# Patient Record
(demographics unavailable — no encounter records)

---

## 2021-03-07 NOTE — CAT SCAN REPORT
CT head/brain wo con



INDICATION:

worsening left sided weakness/numbness.



TECHNIQUE: Routine CT head. All CT scans at this location are performed using CT dose reduction for A
JUWAN by means of automated exposure control.



COMPARISON: 

None.



FINDINGS:



Intracranial: Confluent periventricular and centrum semiovale white matter hypoattenuation most consi
stent with sequela of chronic microvascular disease. Age-indeterminate lacunar infarction. Artifact d
egrades image quality of the posterior fossa. Gray-white matter differentiation is maintained. No int
racranial hemorrhage. No extra axial collection. No hydrocephalus. No herniation.

Sinuses: Paranasal sinuses and mastoid air cells are essentially clear.

Orbits: Globes are intact. 

Calvarium: No acute fracture.





IMPRESSION:

1.  Age-indeterminate right thalamic lacunar infarction which is likely subacute or chronic.



2. Advanced white matter disease most consistent with sequela of chronic microvascular disease.



Signer Name: Alfonso Ren MD 

Signed: 3/7/2021 3:08 PM

Workstation Name: [id number]

## 2021-03-07 NOTE — EVENT NOTE
ED Screening Note


Date of service: 03/07/21


Time: 13:12


ED Screening Note: 


Patient with past medical history of hypertension, glaucoma, and a CVA back in 

September presents to the ER today complaint of worsening left-sided weakness 

and numbness since the stroke in September.  States his symptoms been gradually 

getting worse over the past few months.  He also reports worsening shortness of 

breath and chest pain over the past couple months.  He also reports generalized 

shaking, decreased appetite, abdominal pain, and abnormal gait.  He states his 

symptoms have been going on since his stroke in December but he feels like it is

getting worse.  He states that he fell twice yesterday and today due to his 

balance issues.  He denies any head injury.  Patient supposed to be on Xarelto 

but he has been out of all of his medications for about a month.








This initial assessment/diagnostic orders/clinical plan/treatment(s) is/are 

subject to change based on patients health status, clinical progression and re-

assessment by fellow clinical providers in the ED. Further treatment and workup 

at subsequent clinical providers discretion. Patient/guardian urged not to elope

from the ED as their condition may be serious if not clinically assessed and 

managed. 





Initial orders include: 


CBC, CMP, EKG, troponin, chest x-ray, urine, head CT

## 2021-03-07 NOTE — EMERGENCY DEPARTMENT REPORT
HPI





- General


Chief Complaint: Neuro Symptoms/Deficit


Time Seen by Provider: 03/07/21 12:54





- HPI


HPI: 





This is a 71-year-old -American male who presents to the emergency 

department with complaint of increased numbness and discomfort to the left side 

of his body over the past few months.  The patient had a stroke in September for

which she went to [facility].  This stroke left him with left-sided weakness

and numbness, and also left him with an upper extremity tremor.  The patient 

presents to the emergency department today saying that he has had increased 

numbness and discomfort to the left side of his face, left arm, left side of his

chest and flank, and down his left leg, and that increased over the past few 

months.  The patient is concerned as he says that one of the physicians at [facility]

told him that he would most likely have improvement of his symptoms from the 

stroke.  Patient also has a history of hypertension.  He is a tobacco smoker.  

He does not follow with any primary care physician or neurologist.  He denies 

any headache, vision change, slurred speech, fever, or any acute neurological 

changes.





ED Past Medical Hx





- Past Medical History


Previous Medical History?: Yes


Additional medical history: stroke





- Surgical History


Past Surgical History?: No





- Social History


Smoking Status: Never Smoker


Substance Use Type: None





- Medications


Home Medications: 


                                Home Medications











 Medication  Instructions  Recorded  Confirmed  Last Taken  Type


 


Gabapentin 300 mg PO BID #14 cap 03/07/21  Unknown Rx














ED Review of Systems


ROS: 


Stated complaint: NUMBNESS LEFT SIDE


Other details as noted in HPI





Comment: All other systems reviewed and negative


Constitutional: denies: chills, fever


Eyes: denies: eye pain, vision change


ENT: denies: ear pain, throat pain


Respiratory: denies: cough, shortness of breath


Cardiovascular: denies: chest pain, palpitations


Gastrointestinal: denies: abdominal pain, vomiting


Genitourinary: denies: dysuria, discharge


Musculoskeletal: myalgia.  denies: back pain, arthralgia


Skin: denies: rash, lesions


Neurological: numbness.  denies: headache





Physical Exam





- Physical Exam


Physical Exam: 





GENERAL: The patient is well-developed well-nourished.


HENT: Normocephalic.  Atraumatic.    Patient has moist mucous membranes.


EYES: Extraocular motions are intact.  No nystagmus.


NECK: Supple. Trachea is midline.


CHEST/LUNGS: Clear to auscultation.  There is no respiratory distress noted.


HEART/CARDIOVASCULAR: Regular.  There is no tachycardia.  There is no murmur.


ABDOMEN: Abdomen is soft, nontender.  Patient has normal bowel sounds.  There is

no abdominal distention.


SKIN: Skin is warm and dry. 


NEURO: The patient is awake, alert, and oriented.  The patient is cooperative.  

Subjective decrease sensation to the left side of the face, left arm and left 

leg when compared to the right.  Bilateral upper extremity distal tremor.  No 

facial asymmetry.  There is a mild left upper and lower extremity pronator 

drift.


MUSCULOSKELETAL: There is no tenderness or deformity.  There is no limitation 

range of motion. 





ED Medical Decision Making





- Lab Data


Result diagrams: 


                                 03/07/21 13:16





                                 03/07/21 13:16





                                   Lab Results











  03/07/21 03/07/21 03/07/21 Range/Units





  13:16 13:16 16:27 


 


WBC  3.4 L    (4.5-11.0)  K/mm3


 


RBC  4.30    (3.65-5.03)  M/mm3


 


Hgb  14.6    (11.8-15.2)  gm/dl


 


Hct  42.6    (35.5-45.6)  %


 


MCV  99 H    (84-94)  fl


 


MCH  34 H    (28-32)  pg


 


MCHC  34    (32-34)  %


 


RDW  16.1 H    (13.2-15.2)  %


 


Plt Count  237    (140-440)  K/mm3


 


Lymph % (Auto)  34.2    (13.4-35.0)  %


 


Mono % (Auto)  9.7 H    (0.0-7.3)  %


 


Eos % (Auto)  1.7    (0.0-4.3)  %


 


Baso % (Auto)  0.6    (0.0-1.8)  %


 


Lymph # (Auto)  1.2    (1.2-5.4)  K/mm3


 


Mono # (Auto)  0.3    (0.0-0.8)  K/mm3


 


Eos # (Auto)  0.1    (0.0-0.4)  K/mm3


 


Baso # (Auto)  0.0    (0.0-0.1)  K/mm3


 


Seg Neutrophils %  53.8    (40.0-70.0)  %


 


Seg Neutrophils #  1.8    (1.8-7.7)  K/mm3


 


Sodium   141   (137-145)  mmol/L


 


Potassium   3.7   (3.6-5.0)  mmol/L


 


Chloride   104.1   ()  mmol/L


 


Carbon Dioxide   26   (22-30)  mmol/L


 


Anion Gap   15   mmol/L


 


BUN   7 L   (9-20)  mg/dL


 


Creatinine   0.8   (0.8-1.3)  mg/dL


 


Estimated GFR   > 60   ml/min


 


BUN/Creatinine Ratio   9   %


 


Glucose   106 H   ()  mg/dL


 


Calcium   9.2   (8.4-10.2)  mg/dL


 


Magnesium   1.70   (1.7-2.3)  mg/dL


 


Total Bilirubin   0.40   (0.1-1.2)  mg/dL


 


AST   33   (5-40)  units/L


 


ALT   14   (7-56)  units/L


 


Alkaline Phosphatase   83   ()  units/L


 


Troponin T   < 0.010   (0.00-0.029)  ng/mL


 


Total Protein   7.6   (6.3-8.2)  g/dL


 


Albumin   4.2   (3.9-5)  g/dL


 


Albumin/Globulin Ratio   1.2   %


 


Urine Color     (Yellow)  


 


Urine Turbidity     (Clear)  


 


Urine pH     (5.0-7.0)  


 


Ur Specific Gravity     (1.003-1.030)  


 


Urine Protein     (Negative)  mg/dL


 


Urine Glucose (UA)     (Negative)  mg/dL


 


Urine Ketones     (Negative)  mg/dL


 


Urine Blood     (Negative)  


 


Urine Nitrite     (Negative)  


 


Ur Reducing Substances     


 


Urine Bilirubin     (Negative)  


 


Urine Ictotest     


 


Urine Urobilinogen     (<2.0)  mg/dL


 


Ur Leukocyte Esterase     (Negative)  


 


Urine WBC (Auto)     (0.0-6.0)  /HPF


 


Urine RBC (Auto)     (0.0-6.0)  /HPF


 


Urine Opiates Screen    Negative  


 


Urine Methadone Screen    Negative  


 


Ur Barbiturates Screen    Negative  


 


Ur Phencyclidine Scrn    Negative  


 


Ur Amphetamines Screen    Negative  


 


U Benzodiazepines Scrn    Negative  


 


Urine Cocaine Screen    Negative  


 


U Marijuana (THC) Screen    Positive  


 


Drugs of Abuse Note    Disclamer  














  03/07/21 Range/Units





  16:27 


 


WBC   (4.5-11.0)  K/mm3


 


RBC   (3.65-5.03)  M/mm3


 


Hgb   (11.8-15.2)  gm/dl


 


Hct   (35.5-45.6)  %


 


MCV   (84-94)  fl


 


MCH   (28-32)  pg


 


MCHC   (32-34)  %


 


RDW   (13.2-15.2)  %


 


Plt Count   (140-440)  K/mm3


 


Lymph % (Auto)   (13.4-35.0)  %


 


Mono % (Auto)   (0.0-7.3)  %


 


Eos % (Auto)   (0.0-4.3)  %


 


Baso % (Auto)   (0.0-1.8)  %


 


Lymph # (Auto)   (1.2-5.4)  K/mm3


 


Mono # (Auto)   (0.0-0.8)  K/mm3


 


Eos # (Auto)   (0.0-0.4)  K/mm3


 


Baso # (Auto)   (0.0-0.1)  K/mm3


 


Seg Neutrophils %   (40.0-70.0)  %


 


Seg Neutrophils #   (1.8-7.7)  K/mm3


 


Sodium   (137-145)  mmol/L


 


Potassium   (3.6-5.0)  mmol/L


 


Chloride   ()  mmol/L


 


Carbon Dioxide   (22-30)  mmol/L


 


Anion Gap   mmol/L


 


BUN   (9-20)  mg/dL


 


Creatinine   (0.8-1.3)  mg/dL


 


Estimated GFR   ml/min


 


BUN/Creatinine Ratio   %


 


Glucose   ()  mg/dL


 


Calcium   (8.4-10.2)  mg/dL


 


Magnesium   (1.7-2.3)  mg/dL


 


Total Bilirubin   (0.1-1.2)  mg/dL


 


AST   (5-40)  units/L


 


ALT   (7-56)  units/L


 


Alkaline Phosphatase   ()  units/L


 


Troponin T   (0.00-0.029)  ng/mL


 


Total Protein   (6.3-8.2)  g/dL


 


Albumin   (3.9-5)  g/dL


 


Albumin/Globulin Ratio   %


 


Urine Color  Yellow  (Yellow)  


 


Urine Turbidity  Clear  (Clear)  


 


Urine pH  7.0  (5.0-7.0)  


 


Ur Specific Gravity  1.013  (1.003-1.030)  


 


Urine Protein  <15 mg/dl  (Negative)  mg/dL


 


Urine Glucose (UA)  Neg  (Negative)  mg/dL


 


Urine Ketones  Neg  (Negative)  mg/dL


 


Urine Blood  Neg  (Negative)  


 


Urine Nitrite  Neg  (Negative)  


 


Ur Reducing Substances  Not Reportable  


 


Urine Bilirubin  Neg  (Negative)  


 


Urine Ictotest  Not Reportable  


 


Urine Urobilinogen  < 2.0  (<2.0)  mg/dL


 


Ur Leukocyte Esterase  Neg  (Negative)  


 


Urine WBC (Auto)  < 1.0  (0.0-6.0)  /HPF


 


Urine RBC (Auto)  1.0  (0.0-6.0)  /HPF


 


Urine Opiates Screen   


 


Urine Methadone Screen   


 


Ur Barbiturates Screen   


 


Ur Phencyclidine Scrn   


 


Ur Amphetamines Screen   


 


U Benzodiazepines Scrn   


 


Urine Cocaine Screen   


 


U Marijuana (THC) Screen   


 


Drugs of Abuse Note   














- Radiology Data


Radiology results: report reviewed, image reviewed


interpreted by me: 





Chest x-ray does not show any acute process.  There are no pleural effusions, 

obvious pneumonia and there is no pneumothorax. No significant cardiomegaly.





CT head/brain wo con INDICATION: worsening left sided weakness/numbness. 

TECHNIQUE: Routine CT head. All CT scans at this location are performed using CT

dose reduction for ALARA by means of automated exposure control. COMPARISON: 

None. FINDINGS: Intracranial: Confluent periventricular and centrum semiovale 

white matter hypoattenuation most consistent with sequela of chronic 

microvascular disease. Age-indeterminate lacunar infarction. Artifact degrades 

image quality of the posterior fossa. Gray-white matter differentiation is 

maintained. No intracranial hemorrhage. No extra axial collection. No hyd

rocephalus. No herniation. Sinuses: Paranasal sinuses and mastoid air cells are 

essentially clear. Orbits: Globes are intact. Calvarium: No acute fracture. 

IMPRESSION: 1. Age-indeterminate right thalamic lacunar infarction which is 

likely subacute or chronic. 2. Advanced white matter disease most consistent 

with sequela of chronic microvascular disease. 





- Medical Decision Making





This patient presents to the emergency department with a complaint of some num

bness, but also discomfort, to the left side of his body including the face, 

torso, arm and leg.  Overall the patient says that this has been going on since 

he had a stroke back in September 2020, but it increased over the past few 

months.  He denies any acute physical or neurological changes.  Patient says 

that the discomfort he is experiencing is keeping him from getting appropriate 

sleep at night.  On examination the patient does have some subjective decrease 

sensation to the left side of his body when compared to the right.  There is 

some mild pronator drift to the left upper and lower extremities but this is 

chronic since his CVA.  The patient was seen getting up and ambulating around t

he emergency department multiple times using his cane.





The patient had a CT scan of the head without contrast through triage that does 

not show any acute process.  There is an age-indeterminate right thalamic 

lacunar infarct that is most likely subacute versus chronic and some chronic 

advanced white matter disease.





The patient's labs are mostly unremarkable including CBC, metabolic panel, 

urinalysis and UDS, except for UDS positive for marijuana.





Vital signs have been reassuring throughout his ED course including being 

afebrile.





The patient denies any new or acute weakness.  The decrease sensation/numbness 

also appears to be chronic.  I suspect that the discomfort that he is 

experiencing is some type of neuropathy.





The patient continues to smoke cigarettes.  He does not follow-up with primary 

care regularly and does not have a neurologist.





He appears safe for discharge home.  He will be given outpatient referrals for 

primary care and a neurologist.  He has been given a prescription for some 

gabapentin to help with the suspected neuropathy and hopefully will also help 

him get rest at night.  We had a long conversation about smoking cessation and 

the importance of outpatient follow-up.





The patient also understands the importance of returning to the closest 

emergency department if he does experience any worsening of his symptoms, any 

new or concerning symptoms not addressed during this emergency department visit,

or with any acute distress.


Critical Care Time: No


Critical care attestation.: 


If time is entered above; I have spent that time in minutes in the direct care 

of this critically ill patient, excluding procedure time.








ED Disposition


Clinical Impression: 


 Neuropathy, History of CVA (cerebrovascular accident), Tobacco use disorder





Disposition: DC-01 TO HOME OR SELFCARE


Is pt being admited?: No


Condition: Stable


Instructions:  Neuropathic Pain, Tobacco Use Disorder


Additional Instructions: 


Please follow-up with a primary care physician in the next few days.  I have 

given you a referral for a local primary care physician, Dr. Joaquin Sewell, as 

well as a local clinic, [facility].





I have given you a referral for a local neurologist, Dr. Dos Santos, to follow-up 

regarding your history of a stroke and what may be some neuropathic pain (nerve 

pain).





You have been prescribed a medication that is sedating and therefore should not 

be taken prior to driving, working, and responsible for children and in no way 

should be mixed with alcohol of any quantity.





Return to the emergency department with any worsening of your symptoms, new or 

concerning symptoms not addressed during this current emergency department 

visit, or with any acute distress.


Prescriptions: 


Gabapentin 300 mg PO BID #14 cap


Referrals: 


PRIMARY CARE,MD [Primary Care Provider] - 2-3 Days


JAYCEE DOS SANTOS MD [Referring] - 2-3 Days


JOAQUIN SEWELL MD [Staff Physician] - 2-3 Days


[facility] [Provider Group] - 2-3 Days


Time of Disposition: 17:25

## 2021-07-09 NOTE — XRAY REPORT
CHEST 1 VIEW 



INDICATION: 

dyspnea/chest pain.



COMPARISON: 

None



FINDINGS:



SUPPORT DEVICES: None.



HEART: Within normal limits. 



LUNGS/PLEURA: No acute air space or interstitial disease. 



ADDITIONAL FINDINGS: Moderate dextroscoliosis centered at the lower thoracic spine level.







IMPRESSION:

1. No acute findings.



Signer Name: Yves Collazo MD 

Signed: 3/7/2021 2:05 PM

Workstation Name: [id number]

## 2021-11-16 NOTE — EMERGENCY DEPARTMENT REPORT
ED Fall HPI





- General


Stated Complaint: FALL INJURY


Time Seen by Provider: 11/16/21 22:48


Source: patient, EMS


Mode of arrival: Stretcher


Limitations: No Limitations





- History of Present Illness


Initial Comments: 





CC: "I fell."





HPI: This is a 1-year-old male history of CVA with residual paralysis and gait 

instability who presents with left rib cage pain after fall 2 days ago.  He 

normally walks with a cane or walker.  He stumbled and fell onto the ground.  He

denies head trauma.  He denies loss consciousness.  He denies any associated 

injury.  He is otherwise in his normal state of health.


MD Complaint: fall


-: Sudden


Fall From: standing


When Fall Occurred: other (3 days ago)


Place Fall Occurred: home


Loss of Consciousness: none


Prolonged Down Time?: no


Symptoms Prior to Fall: none


Location: chest


Severity: mild


Quality: aching


Context: tripped/slipped


Associated Symptoms: denies





- Related Data


                                  Previous Rx's











 Medication  Instructions  Recorded  Last Taken  Type


 


Gabapentin 300 mg PO BID #14 cap 03/07/21 Unknown Rx


 


HYDROcodone/APAP 5-325 [[place] 1 each PO Q6HR PRN #15 tablet 11/17/21 Unknown Rx





5/325]    


 


Ibuprofen [Motrin 400 MG tab] 400 mg PO Q8H PRN #20 tablet 11/17/21 Unknown Rx











                                    Allergies











Allergy/AdvReac Type Severity Reaction Status Date / Time


 


No Known Allergies Allergy   Verified 03/07/21 12:49














ED Review of Systems


ROS: 


Stated complaint: FALL INJURY


Other details as noted in HPI





Comment: All other systems reviewed and negative


Constitutional: denies: fever


Respiratory: denies: cough, shortness of breath


Cardiovascular: chest pain


Gastrointestinal: denies: abdominal pain, nausea, vomiting


Neurological: denies: headache





ED Past Medical Hx





- Past Medical History


Previous Medical History?: Yes


Hx CVA: Yes


Additional medical history: stroke





- Surgical History


Past Surgical History?: Yes


Additional Surgical History: Neck surgery





- Social History


Smoking Status: Current Every Day Smoker


Substance Use Type: Alcohol





- Medications


Home Medications: 


                                Home Medications











 Medication  Instructions  Recorded  Confirmed  Last Taken  Type


 


Gabapentin 300 mg PO BID #14 cap 03/07/21  Unknown Rx


 


HYDROcodone/APAP 5-325 [[place] 1 each PO Q6HR PRN #15 tablet 11/17/21  Unknown Rx





5/325]     


 


Ibuprofen [Motrin 400 MG tab] 400 mg PO Q8H PRN #20 tablet 11/17/21  Unknown Rx














ED Physical Exam





- General


Limitations: No Limitations


General appearance: alert, in no apparent distress





- Head


Head exam: Present: atraumatic, normocephalic





- Eye


Eye exam: Present: normal appearance





- ENT


ENT exam: Present: mucous membranes moist





- Neck


Neck exam: Present: normal inspection, full ROM





- Respiratory


Respiratory exam: Present: normal lung sounds bilaterally, chest wall 

tenderness.  Absent: respiratory distress, wheezes, rales, rhonchi





- Cardiovascular


Cardiovascular Exam: Present: regular rate, normal rhythm.  Absent: systolic 

murmur, diastolic murmur, rubs, gallop





- GI/Abdominal


GI/Abdominal exam: Present: soft, normal bowel sounds.  Absent: distended, 

tenderness, guarding, rebound





- Extremities Exam


Extremities exam: Present: normal inspection





- Back Exam


Back exam: Present: normal inspection





- Neurological Exam


Neurological exam: Present: alert, oriented X3





- Psychiatric


Psychiatric exam: Present: normal affect, normal mood





- Skin


Skin exam: Present: warm, dry, intact, normal color.  Absent: rash





ED Course


                                   Vital Signs











  11/16/21





  22:52


 


Temperature 98.1 F


 


Pulse Rate 81


 


Respiratory 20





Rate 


 


Blood Pressure 155/117


 


O2 Sat by Pulse 97





Oximetry 














ED Medical Decision Making





- Radiology Data


Radiology results: report reviewed





Patient Name: ABRAHAM ARORA


Patient ID: [id number]


Gender: Male


YOB: 1950


Home Phone: [phone]


Referring Provider: DEREK GUTIERREZ


Organization: [facility]


Accession Number: [id number]


Requested Date: November 16, 2021 22:49


Report Status: Final


Requested Procedure: 1


Procedure Description: XR ribs UNI w PA chest 3+V LT


Modality: XR


Findings


Reporting MD: Nile Michelle


Dictation Time: November 16, 2021 22:54


: Not available


Transcription Date:


Chest with left RIBS 4 views. 


HISTORY: Rib pain status post fall. 


FINDINGS: Heart size is normal. 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

the lower thoracic spine. The lungs are clear. 


Fractures involving the anterolateral aspect of the left sixth and seventh ribs.




Signer Name: Nile Michelle MD 


Signed: 11/16/2021 10:54 PM 


Workstation Name: [id number]





- Medical Decision Making





Rib fractures of the sixth and seventh rib without pneumothorax.  Prescribed 

Norco and ibuprofen.


Critical care attestation.: 


If time is entered above; I have spent that time in minutes in the direct care 

of this critically ill patient, excluding procedure time.








ED Disposition


Clinical Impression: 


 Ribs, multiple fractures





Disposition: 01 HOME / SELF CARE / HOMELESS


Is pt being admited?: No


Does the pt Need Aspirin: No


Condition: Stable


Instructions:  Rib Fracture, Easy-to-Read


Prescriptions: 


Ibuprofen [Motrin 400 MG tab] 400 mg PO Q8H PRN #20 tablet


 PRN Reason: Pain , Severe (7-10)


HYDROcodone/APAP 5-325 [[place] 5/325] 1 each PO Q6HR PRN #15 tablet


 PRN Reason: Pain


Referrals: 


OLAF PALMA MD [Staff Physician] - as needed

## 2021-11-16 NOTE — XRAY REPORT
Chest with left RIBS 4 views.



HISTORY: Rib pain status post fall.



FINDINGS: Heart size is normal. Prominent dextroconvex scoliosis has its apex at the lower thoracic s
pine. The lungs are clear.



Fractures involving the anterolateral aspect of the left sixth and seventh ribs.



Signer Name: Nile Michelle MD 

Signed: 11/16/2021 11:54 PM

Workstation Name: [email or web address]\""[id number]

## 2021-11-24 NOTE — EMERGENCY DEPARTMENT REPORT
HPI





- General


Chief Complaint: Abdominal Pain


Time Seen by Provider: 11/24/21 13:12





- HPI


HPI: 





Room 29








The patient is a 71-year-old male present with a chief complaint of rib pain.  

The patient sustained a fall from standing 11/14/2021 and presented to this ED 

11/16/2021 where he was diagnosed with a left anterolateral 6th and 7th rib 

fracture without pneumothorax.  The patient presented to this ED 11/18/2021 for 

the same pain after stating the person he gave his [place] prescription to had not

returned with his medication.  I saw the patient 11/22/2021 for the same and he 

informed me that the person who had given his [place] prescription to had not 

return with this medication.  I wrote the patient a prescription for Norco 5/325

#14 at that time upon discharge.  Today the patient returns for her rib pain 

when asked him if he has been taking the pain medication he states the person 

who he had given the prescription to to get filled has not returned with his 

medication.  I reminded the patient that he told me this last time that I saw 

him 2 days ago and then I had written him a prescription for pain medication.  

The patient then pauses and says "I do not know."





ED Past Medical Hx





- Past Medical History


Previous Medical History?: Yes


Hx Hypertension: Yes


Hx CVA: Yes


Additional medical history: stroke





- Surgical History


Additional Surgical History: Neck surgery





- Family History


Family history: no significant





- Social History


Smoking Status: Current Every Day Smoker (1/7 pack/day)


Substance Use Type: None (Denies illicit drug use)





- Medications


Home Medications: 


                                Home Medications











 Medication  Instructions  Recorded  Confirmed  Last Taken  Type


 


Gabapentin 300 mg PO BID #14 cap 03/07/21  Unknown Rx


 


HYDROcodone/APAP 5-325 [[place] 1 each PO Q6HR PRN #15 tablet 11/17/21  Unknown Rx





5/325]     


 


Ibuprofen [Motrin 400 MG tab] 400 mg PO Q8H PRN #20 tablet 11/17/21  Unknown Rx


 


HYDROcodone/APAP 5-325 [[place] 1 - 2 each PO Q6HR PRN #14 tablet 11/22/21  

Unknown Rx





5/325]     


 


Spirometers and Accessories 1 each MC TID #1 each 11/22/21  Unknown Rx





[Mistassist]     














ED Review of Systems


ROS: 


Stated complaint: FLANK PAIN


Other details as noted in HPI





Constitutional: no symptoms reported


Respiratory: no symptoms reported


Endocrine: no symptoms reported





Physical Exam





- Physical Exam


Vital Signs: 


                                   Vital Signs











  11/24/21 11/24/21





  12:33 13:50


 


Temperature 98.7 F 98.4 F


 


Pulse Rate 91 H 81


 


Respiratory 16 12





Rate  


 


Blood Pressure 176/118 122/74





[Right]  


 


O2 Sat by Pulse 96 97





Oximetry  











Physical Exam: 





GENERAL: The patient is well-developed well-nourished male sitting in chair not 

appearing to be in acute distress


HEENT: Normocephalic.  Atraumatic.  Extraocular motions are intact.  Patient has

 moist mucous membranes.


NECK: Supple.  Trachea midline


CHEST/LUNGS: Clear to auscultation.  There is no respiratory distress noted.


HEART/CARDIOVASCULAR: Regular.  There is no tachycardia.  There is no gallop rub

 or murmur.


ABDOMEN: Abdomen is soft, nontender.  Patient has normal bowel sounds.  There is

 no abdominal distention.


SKIN: There is no rash.  There is no edema.  There is no diaphoresis.


NEURO: The patient is awake, alert, and oriented.  The patient is cooperative.  

The patient has no focal neurologic deficits.  The patient has normal speech.  

GCS 15


MUSCULOSKELETAL: There is no evidence of acute injury.





ED Course


                                   Vital Signs











  11/24/21 11/24/21





  12:33 13:50


 


Temperature 98.7 F 98.4 F


 


Pulse Rate 91 H 81


 


Respiratory 16 12





Rate  


 


Blood Pressure 176/118 122/74





[Right]  


 


O2 Sat by Pulse 96 97





Oximetry  














ED Medical Decision Making





- Differential Diagnosis


Rib fracture


Critical care attestation.: 


If time is entered above; I have spent that time in minutes in the direct care 

of this critically ill patient, excluding procedure time.








ED Disposition


Clinical Impression: 


 Ribs, multiple fractures





Disposition: 01 HOME / SELF CARE / HOMELESS


Is pt being admited?: No


Does the pt Need Aspirin: No


Condition: Stable


Instructions:  Rib Fracture, Easy-to-Read


Additional Instructions: 


Return to the emergency department should you develop worsening symptoms, 

inability to tolerate food or liquids, high fever or any other concerns


Referrals: 


CHANDU CONNOR MD [Primary Care Provider] - 3-5 Days


LUIS MANUEL BERMUDEZ MD [Referring] - 3-5 Days


[facility] [Provider Group] - 3-5 Days


Time of Disposition: 14:22

## 2022-05-18 NOTE — XRAY REPORT
XR shoulder BILAT 2+V



INDICATION / CLINICAL INFORMATION:

Pain.



COMPARISON: 

None available.



FINDINGS:

No acute fracture or malalignment of either shoulder. There is mild osteoarthritis bilaterally. No fo
argelia soft tissue abnormality. Visualized lungs are clear.



IMPRESSION: No acute osseous abnormality of either shoulder.



Signer Name: Beto Bower MD 

Signed: 5/18/2022 11:51 PM

Workstation Name: [id number]-

## 2022-05-18 NOTE — EMERGENCY DEPARTMENT REPORT
HPI





- General


Time Seen by Provider: 22 23:01





- HPI


HPI: 





Room 1








The patient is a 72-year-old male present with a chief complaint of shoulder 

pain.  Patient states he came to the emergency department because he has had 

pain in both shoulders.  Patient states since his stroke several months ago he 

has had pain in his left side.  Patient states for the past 4 weeks he has 

frequently had intermittent pain in left shoulder described as sharp in nature. 

Patient also complains of pain in the right shoulder for the past 3 weeks 

intermittently and less frequent than the right.  Patient denies other symptoms.

 Patient currently gives his left shoulder pain a score of 8/10





ED Past Medical Hx





- Past Medical History


Hx Hypertension: Yes


Hx CVA: Yes (Left-sided weakness)


Additional medical history: stroke





- Surgical History


Additional Surgical History: Neck surgery





- Family History


Family history: no significant





- Social History


Smoking Status: Current Every Day Smoker ( pack/day)


Substance Use Type: None (Denies illicit drug use), Alcohol (Occasional)





- Medications


Home Medications: 


                                Home Medications











 Medication  Instructions  Recorded  Confirmed  Last Taken  Type


 


Gabapentin 300 mg PO BID #14 cap 21  Unknown Rx


 


HYDROcodone/APAP 5-325 [[place] 1 each PO Q6HR PRN #15 tablet 21  Unknown Rx





5/325]     


 


Ibuprofen [Motrin 400 MG tab] 400 mg PO Q8H PRN #20 tablet 21  Unknown Rx


 


HYDROcodone/APAP 5-325 [[place] 1 - 2 each PO Q6HR PRN #14 tablet 21  

Unknown Rx





5/325]     


 


Spirometers and Accessories 1 each MC TID #1 each 21  Unknown Rx





[Mistassist]     


 


Naproxen [Naprosyn] 500 mg PO Q12H PRN #20 22  Unknown Rx


 


traMADoL [Ultram] 50 mg PO Q6HR PRN #20 tablet 22  Unknown Rx














ED Review of Systems


ROS: 


Stated complaint: WEAKNESS


Other details as noted in HPI





Constitutional: no symptoms reported


Eyes: denies: eye pain


ENT: denies: throat pain


Respiratory: no symptoms reported


Cardiovascular: denies: chest pain


Endocrine: no symptoms reported


Gastrointestinal: denies: abdominal pain


Genitourinary: denies: dysuria


Musculoskeletal: arthralgia


Neurological: denies: headache





Physical Exam





- Physical Exam


Physical Exam: 





GENERAL: The patient is well-developed well-nourished male lying on stretcher 

not appearing to be in acute distress. []


HEENT: Normocephalic.  Atraumatic.  Extraocular motions are intact.  Patient has

 moist mucous membranes.


NECK: Supple.  Trachea midline


CHEST/LUNGS: Clear to auscultation.  There is no respiratory distress noted.


HEART/CARDIOVASCULAR: Regular.  There is no tachycardia.  There is no gallop rub

 or murmur.  2+ left radial


ABDOMEN: Abdomen is soft, nontender.  Patient has normal bowel sounds.  There is

 no abdominal distention.


SKIN: There is no rash.  There is no edema.  There is no diaphoresis.


NEURO: The patient is awake, alert, and oriented.  The patient is cooperative.  

The patient has left sided weakness from previous CVA.  The patient has normal 

speech


MUSCULOSKELETAL:  There is no evidence of acute injury.





ED Medical Decision Making





- Lab Data


Result diagrams: 


                                 22 23:31





                                 22 23:31





                                Laboratory Tests











  22





  23:31 23:31 02:32


 


WBC  6.1  


 


RBC  4.07  


 


Hgb  13.1  


 


Hct  39.2  


 


MCV  96 H  


 


MCH  32  


 


MCHC  33  


 


RDW  13.5  


 


Plt Count  144  


 


Lymph % (Auto)  11.5 L  


 


Mono % (Auto)  5.7  


 


Eos % (Auto)  0.3  


 


Baso % (Auto)  0.4  


 


Lymph # (Auto)  0.7 L  


 


Mono # (Auto)  0.3  


 


Eos # (Auto)  0.0  


 


Baso # (Auto)  0.0  


 


Seg Neutrophils %  82.1 H  


 


Seg Neutrophils #  5.0  


 


Sodium   141 


 


Potassium   3.7 


 


Chloride   104.2 


 


Carbon Dioxide   24 


 


Anion Gap   17 


 


BUN   23 H 


 


Creatinine   1.1 


 


Estimated GFR   > 60 


 


BUN/Creatinine Ratio   21 


 


Glucose   99 


 


Calcium   9.8 


 


Total Creatine Kinase   386 H 


 


CK-MB (CK-2)   4.9 H 


 


CK-MB (CK-2) Rel Index   1.2 


 


Troponin T   < 0.010  < 0.010














- EKG Data


-: EKG Interpreted by Me


EKG shows normal: sinus rhythm, axis


Rate: normal





- EKG Data


When compared to previous EKG there are: previous EKG unavailable


Interpretation: other (No ischemic changes seen)





- Radiology Data


Radiology results: report reviewed (Bilateral shoulder x-ray), image reviewed 

(Bilateral shoulder x-ray)


interpreted by me: 





Bilateral shoulder x-ray-no acute fracture, no dislocation





[facility] [address] [place] [address] 

XRay Report Signed Patient: ABRAHAM ARORA MR#: [id number] : 1950 

Acct:[id number] Age/Sex: 72 / M ADM Date: 22 Loc: ED Attending Dr: 

Ordering Physician: LENA VELAZQUEZ MD Date of Service: 22 Procedure(s): XR 

shoulder BILAT 2+V Accession Number(s): [id number] cc: LENA VELAZQUEZ MD Fluoro Time 

In Minutes: XR shoulder BILAT 2+V INDICATION / CLINICAL INFORMATION: Pain. 

COMPARISON: None available. FINDINGS: No acute fracture or malalignment of 

either shoulder. There is mild osteoarthritis bilaterally. No focal soft tissue 

abnormality. Visualized lungs are clear. IMPRESSION: No acute osseous 

abnormality of either shoulder. Signer Name: Krunal Bower MD Signed: 

2022 11:51 PM Workstation Name: [email or web address]- Transcribed By: JS Dictated 

By: KRUNAL BOWER MD Electronically Authenticated By: KRUNAL BOWER MD Signed Date/Time: 22 DD/DT: 22 TD/TT:








- Differential Diagnosis


Arthralgia, anginal equivalent,


Critical care attestation.: 


If time is entered above; I have spent that time in minutes in the direct care 

of this critically ill patient, excluding procedure time.








ED Disposition


Clinical Impression: 


 Left shoulder pain, Right shoulder pain





Disposition: 01 HOME / SELF CARE / HOMELESS


Is pt being admited?: No


Does the pt Need Aspirin: No


Condition: Stable


Instructions:  Shoulder Pain


Additional Instructions: 


Return to the emergency department should you develop worsening symptoms, 

inability to tolerate food or liquids, high fever or any other concerns


Prescriptions: 


Naproxen [Naprosyn] 500 mg PO Q12H PRN #20


 PRN Reason: Pain, Moderate (4-6)


traMADoL [Ultram] 50 mg PO Q6HR PRN #20 tablet


 PRN Reason: Pain


Referrals: 


MASHA FERRIS MD [Staff Physician] - 3-5 Days (Dr. Ferris is an 

orthopedic surgeon.  Please follow-up with him for further evaluation)


Time of Disposition: 03:43

## 2022-05-20 NOTE — EMERGENCY DEPARTMENT REPORT
HPI





- General


Chief Complaint: Alcohol


Time Seen by Provider: 05/20/22 06:51





- HPI


HPI: 





Room 1











The patient is a 72-year-old male brought in by police for unknown reasons.  The

patient was seen here last night and left AGAINST MEDICAL ADVICE.  Patient s

tates she was standing outside of a convenience store when police contacted.  

When asked why the patient states he does not know.  The patient states police 

told him "they are going to come see me."  Patient denies any preceding 

altercation/assault.  Patient denies complaints.  Per the charge nurse PD did 

not have addressed to the patient's group home so he was dropped off here in the

ED.  Transportation to take the patient to his group home has already been 

arranged for 09:30





ED Past Medical Hx





- Past Medical History


Hx Hypertension: Yes


Hx CVA: Yes (Left-sided weakness)


Additional medical history: stroke





- Surgical History


Additional Surgical History: Neck surgery





- Family History


Family history: no significant





- Social History


Smoking Status: Current Every Day Smoker (1/14 pack/day)


Substance Use Type: None (Denies illicit drug use), Alcohol (Occasional)





- Medications


Home Medications: 


                                Home Medications











 Medication  Instructions  Recorded  Confirmed  Last Taken  Type


 


Gabapentin 300 mg PO BID #14 cap 03/07/21  Unknown Rx


 


HYDROcodone/APAP 5-325 [[place] 1 each PO Q6HR PRN #15 tablet 11/17/21  Unknown Rx





5/325]     


 


Ibuprofen [Motrin 400 MG tab] 400 mg PO Q8H PRN #20 tablet 11/17/21  Unknown Rx


 


HYDROcodone/APAP 5-325 [[place] 1 - 2 each PO Q6HR PRN #14 tablet 11/22/21  Unkn

own Rx





5/325]     


 


Spirometers and Accessories 1 each MC TID #1 each 11/22/21  Unknown Rx





[Mistassist]     


 


Naproxen [Naprosyn] 500 mg PO Q12H PRN #20 05/19/22  Unknown Rx


 


traMADoL [Ultram] 50 mg PO Q6HR PRN #20 tablet 05/19/22  Unknown Rx














ED Review of Systems


ROS: 


Stated complaint: MEDICAL CLEARANCE


Other details as noted in HPI





Constitutional: no symptoms reported


Eyes: denies: eye pain


ENT: denies: throat pain


Respiratory: no symptoms reported


Cardiovascular: denies: chest pain


Endocrine: no symptoms reported


Gastrointestinal: denies: abdominal pain


Genitourinary: denies: dysuria


Musculoskeletal: denies: back pain


Neurological: denies: headache





Physical Exam





- Physical Exam


Vital Signs: 


                                   Vital Signs











  05/20/22





  03:57


 


O2 Sat by Pulse 98





Oximetry 








                                   Vital Signs











  05/20/22 05/20/22





  03:57 07:13


 


Temperature  98.2 F


 


Pulse Rate  70


 


Respiratory  17





Rate  


 


Blood Pressure  134/84





[Left]  


 


O2 Sat by Pulse 98 96





Oximetry  











Physical Exam: 





GENERAL: The patient is well-developed well-nourished male lying on stretcher 

not appearing to be in acute distress. []


HEENT: Normocephalic.  Atraumatic.  Extraocular motions are intact.  Patient has

 moist mucous membranes.


NECK: Supple.  Trachea midline


CHEST/LUNGS: Clear to auscultation.  There is no respiratory distress noted.


HEART/CARDIOVASCULAR: Regular.  There is no tachycardia.  There is no gallop rub

 or murmur.


ABDOMEN: Abdomen is soft, nontender.  Patient has normal bowel sounds.  There is

 no abdominal distention.


SKIN: There is no rash.  There is no edema.  There is no diaphoresis.


NEURO: The patient is awake, alert, and oriented.  The patient is cooperative.  

GCS 15.  The patient has normal speech .


MUSCULOSKELETAL:  There is no evidence of acute injury.





ED Course


                                   Vital Signs











  05/20/22





  03:57


 


O2 Sat by Pulse 98





Oximetry 














ED Medical Decision Making





- Differential Diagnosis


Normal exam


Critical care attestation.: 


If time is entered above; I have spent that time in minutes in the direct care 

of this critically ill patient, excluding procedure time.








ED Disposition


Clinical Impression: 


 Normal exam





Disposition: 01 HOME / SELF CARE / HOMELESS


Is pt being admited?: No


Does the pt Need Aspirin: No


Condition: Stable


Instructions:  Health Maintenance After Age 65, Preventive Care 65 Years and 

Older, Male


Additional Instructions: 


Return to the emergency department should you develop worsening symptoms, 

inability to tolerate food or liquids, high fever or any other concerns


Referrals: 


OLAF PALMA MD [Primary Care Provider] - 3-5 Days


Time of Disposition: 08:51

## 2022-05-20 NOTE — ELECTROCARDIOGRAPH REPORT
[facility]

                                       

Test Date:    2022               Test Time:    01:02:22

Pat Name:     ABRAHAM ARORA               Department:   

Patient ID:   [id number]          Room:          

Gender:       M                        Technician:   [id number]

:          1950               Requested By: LENA VELAZQUEZ

Order Number: [id number]              Reading MD:   Daniel Duron

                                 Measurements

Intervals                              Axis          

Rate:         78                       P:            65

NE:           163                      QRS:          10

QRSD:         96                       T:            66

QT:           417                                    

QTc:          475                                    

                           Interpretive Statements

Pacemaker spikes or artifacts

Sinus rhythm

Atrial premature complex

Probable anteroseptal infarct, old

No previous ECG available for comparison

Electronically Signed On 2022 9:57:24 EDT by Daniel Duron